# Patient Record
Sex: FEMALE | Race: BLACK OR AFRICAN AMERICAN
[De-identification: names, ages, dates, MRNs, and addresses within clinical notes are randomized per-mention and may not be internally consistent; named-entity substitution may affect disease eponyms.]

---

## 2020-09-14 ENCOUNTER — HOSPITAL ENCOUNTER (EMERGENCY)
Dept: HOSPITAL 88 - ER | Age: 65
Discharge: HOME | End: 2020-09-14
Payer: COMMERCIAL

## 2020-09-14 VITALS — WEIGHT: 141 LBS | HEIGHT: 59 IN | BODY MASS INDEX: 28.43 KG/M2

## 2020-09-14 DIAGNOSIS — R33.9: Primary | ICD-10-CM

## 2020-09-14 DIAGNOSIS — R30.0: ICD-10-CM

## 2020-09-14 LAB
BACTERIA URNS QL MICRO: (no result) /HPF
BILIRUB UR QL: NEGATIVE
CLARITY UR: (no result)
COLOR UR: (no result)
DEPRECATED RBC URNS MANUAL-ACNC: >50 /HPF (ref 0–5)
EPI CELLS URNS QL MICRO: (no result) /LPF
KETONES UR QL STRIP.AUTO: (no result)
LEUKOCYTE ESTERASE UR QL STRIP.AUTO: NEGATIVE
NITRITE UR QL STRIP.AUTO: NEGATIVE
PROT UR QL STRIP.AUTO: (no result)
SP GR UR STRIP: 1.02 (ref 1.01–1.02)
UROBILINOGEN UR STRIP-MCNC: 0.2 MG/DL (ref 0.2–1)
WBC #/AREA URNS HPF: (no result) /HPF (ref 0–5)

## 2020-09-14 PROCEDURE — 99284 EMERGENCY DEPT VISIT MOD MDM: CPT

## 2020-09-14 PROCEDURE — 81001 URINALYSIS AUTO W/SCOPE: CPT

## 2020-09-14 PROCEDURE — 51700 IRRIGATION OF BLADDER: CPT

## 2020-09-14 PROCEDURE — 74176 CT ABD & PELVIS W/O CONTRAST: CPT

## 2020-09-14 NOTE — DIAGNOSTIC IMAGING REPORT
EXAMINATION: CT of the abdomen and pelvis without contrast.

 

TECHNIQUE: Spiral CT images of the abdomen and pelvis were performed from the

lung bases to the lesser trochanters.  No intravenous contrast was given per

physician's request.  Coronal and sagittal reformatted images were obtained.



COMPARISON:  None.



CLINICAL HISTORY:Suprapubic pain, UTI, patient does self-catheterization

     

DISCUSSION: ABSENCE OF INTRAVENOUS CONTRAST DECREASES SENSITIVITY FOR DETECTION

OF FOCAL LESIONS AND VASCULAR PATHOLOGY.



ABDOMEN/PELVIS:



LOWER THORAX: Mild bilateral lower lobe dependent atelectasis. Lung bases are

otherwise clear. Mild atherosclerotic calcification of the coronary arteries. 



HEPATOBILIARY: No focal hepatic lesions.  No intra or extrahepatic biliary

ductal dilation.



GALLBLADDER: No radio-opaque stones or sludge.  No wall thickening.



SPLEEN: No splenomegaly.



PANCREAS: No focal masses or ductal dilatation.



ADRENALS: No adrenal nodules.



KIDNEYS/URETERS: No renal or ureteral calculi, hydronephrosis or obstruction.

No contour abnormalities or perinephric stranding.



PELVIC ORGANS/BLADDER: Bladder is markedly distended, with bladder dome above

the level of the umbilicus. No wall thickening. No focal lesions. Uterus is

unremarkable. No adnexal masses.



PERITONEUM/RETROPERITONEUM: No free air or fluid.



LYMPH NODES: No intra-abdominal,retroperitoneal, pelvic or inguinal

lymphadenopathy.



VESSELS: Atherosclerotic calcification of the distal abdominal aorta and

proximal iliac vessels.



GI TRACT: No bowel dilation or evidence of obstruction. No pericolonic

inflammatory changes. Appendix is identified and normal in caliber. 4-5 mm

intraluminal radiopaque density in the descending colon likely represents

undigested pill material (series 3, image 110). Small amount of retained stool

in the rectum.



BONES AND SOFT TISSUES: No aggressive lytic or suspicious focal sclerotic

lesions. Generalized osteopenia. Degenerated discs in the lower thoracic and

lumbosacral spine, worse at L5-S1. Mild canal stenosis at L4-L5. Facet

hypertrophy L4-L5 and L5-S1.

Small fat-containing of ventricle hernia.



IMPRESSION: 





1. Markedly distended bladder, with bladder dome above the level of the

umbilicus. No focal lesions or wall thickening in this noncontrast exam.

Findings may represent bladder outlet obstruction.



2. No renal, ureteral or bladder calculi.



Signed by: Dr. Seth Garcia M.D. on 9/14/2020 6:41 PM

## 2020-09-14 NOTE — EMERGENCY DEPARTMENT NOTE
History of Present Illnes


History of Present Illness


Chief Complaint:  Genitourinary


History of Present Illness


This is a 64 year old  female with urinary retention which requires self cath

eterization presents to the ED for one day h/o dysuria  .


Historian:  Patient, Family Member


Arrival Mode:  Car


Onset (how long ago):  day(s) (2)


Location:  suprapubic


Radiation:  Reports abdomen (suprapubic )


Severity:  mild


Onset quality:  gradual


Duration (how long):  day(s) (1)


Timing of current episode:  constant


Progression:  unchanged


Chronicity:  new


Context:  Denies recent illness, Denies recent surgery, Denies recent 

immobilization, Denies recent travel, Denies trauma/injury, Denies new 

medications, Denies hx of DVT/PE, Denies non-compliance w/ medications, Denies 

other


Relieving factors:  none


Exacerbating factors:  none


Associated symptoms:  Denies denies other symptoms, Denies confusion, Denies 

chest pain, Denies cough, Denies diaphoresis, Denies fever/chills, Denies 

headaches, Denies loss of appetite, Denies malaise, Denies nausea/vomiting, 

Denies rash, Denies seizure, Denies shortness of breath, Denies syncope, Denies 

weakness, Denies other (JAY SCHMIDT DO)





Past Medical/Family History


Physician Review


I have reviewed the patient's past medical and family history.  Any updates have

been documented here.


 (JAY SCHMIDT DO)





Past Medical History


Recent Fever:  No


Clinical Suspicion of Infectio:  Yes


New/Unexplained Change in Ment:  No


Other Medical History:  


urinary retention


Past Surgical History:  None


 (JAY SCHMIDT DO)





Social History


Smoking Cessation:  Never Smoker


Alcohol Use:  None


Any Illegal Drug Use:  No


 (JAY SCHMIDT DO)





Review of Systems


Review of Systems


Constitutional:  Reports no symptoms


EENTM:  Reports no symptoms


Cardiovascular:  Reports no symptoms


Respiratory:  Reports no symptoms


Gastrointestinal:  Reports no symptoms


Genitourinary:  Reports dysuria


Musculoskeletal:  Reports no symptoms


Integumentary:  Reports no symptoms


Neurological:  Reports no symptoms


Psychological:  Reports no symptoms


Endocrine:  Reports no symptoms


Hematological/Lymphatic:  Reports no symptoms (JAY SCHMIDT DO)





Physical Exam


Related Data


Allergies:  


Coded Allergies:  


     No Known Allergies (Unverified , 20)


Triage Vital Signs





Vital Signs








  Date Time  Temp Pulse Resp B/P (MAP) Pulse Ox O2 Delivery O2 Flow Rate FiO2


 


20 17:31 97.6 75 16 138/87 97 Room Air  








 (JAY SCHMIDT DO)





Physical Exam


CONSTITUTIONAL





HENT


EYES





NECK


PULMONARY


CARDIOVASCULAR





GASTROINTESTINAL





GENITOURINARY





SKIN


MUSCULOSKELETAL





NEUROLOGICAL





PSYCHOLOGICAL





 (SCHMIDTJAY )





Results


Laboratory


Lab results reviewed:  Yes


 (ALICE RIVERA MD)


Imaging


Imaging results reviewed:  Yes


Impressions





[place] [id number]








Patient Name: AUGUST ALICIA         MR #: [id number]      


: 1955   Age/Sex: 64/F


Acct #: [id number]   Req #: [id number]


Adm Physician:       


Ordered by: JAY SCHMIDT DO      Report #: [id number]   


Location: ER      Room/Bed:    


_______________________________________________________________

____________________________________





Procedure: [id number] CT/CT ABDOMEN/PELVIS WO


Exam Date: 20                            Exam Time: 1800








REPORT STATUS: Signed





EXAMINATION: CT of the abdomen and pelvis without contrast.


 


TECHNIQUE: Spiral CT images of the abdomen and pelvis were performed from the


lung bases to the lesser trochanters.  No intravenous contrast was given per


physician's request.  Coronal and sagittal reformatted images were obtained.





COMPARISON:  None.





CLINICAL HISTORY:Suprapubic pain, UTI, patient does self-catheterization


     


DISCUSSION: ABSENCE OF INTRAVENOUS CONTRAST DECREASES SENSITIVITY FOR DETECTION


OF FOCAL LESIONS AND VASCULAR PATHOLOGY.





ABDOMEN/PELVIS:





LOWER THORAX: Mild bilateral lower lobe dependent atelectasis. Lung bases are


otherwise clear. Mild atherosclerotic calcification of the coronary arteries. 





HEPATOBILIARY: No focal hepatic lesions.  No intra or extrahepatic biliary


ductal dilation.





GALLBLADDER: No radio-opaque stones or sludge.  No wall thickening.





SPLEEN: No splenomegaly.





PANCREAS: No focal masses or ductal dilatation.





ADRENALS: No adrenal nodules.





KIDNEYS/URETERS: No renal or ureteral calculi, hydronephrosis or obstruction.


No contour abnormalities or perinephric stranding.





PELVIC ORGANS/BLADDER: Bladder is markedly distended, with bladder dome above


the level of the umbilicus. No wall thickening. No focal lesions. Uterus is


unremarkable. No adnexal masses.





PERITONEUM/RETROPERITONEUM: No free air or fluid.





LYMPH NODES: No intra-abdominal,retroperitoneal, pelvic or inguinal


lymphadenopathy.





VESSELS: Atherosclerotic calcification of the distal abdominal aorta and


proximal iliac vessels.





GI TRACT: No bowel dilation or evidence of obstruction. No pericolonic


inflammatory changes. Appendix is identified and normal in caliber. 4-5 mm


intraluminal radiopaque density in the descending colon likely represents


undigested pill material (series 3, image 110). Small amount of retained stool


in the rectum.





BONES AND SOFT TISSUES: No aggressive lytic or suspicious focal sclerotic


lesions. Generalized osteopenia. Degenerated discs in the lower thoracic and


lumbosacral spine, worse at L5-S1. Mild canal stenosis at L4-L5. Facet


hypertrophy L4-L5 and L5-S1.


Small fat-containing of ventricle hernia.





IMPRESSION: 








1. Markedly distended bladder, with bladder dome above the level of the


umbilicus. No focal lesions or wall thickening in this noncontrast exam.


Findings may represent bladder outlet obstruction.





2. No renal, ureteral or bladder calculi.





Signed by: Dr. Ming Garcia M.D. on 2020 6:41 PM








Dictated By: MING GARCIA MD


Electronically Signed By: MING GARCIA MD on 20


Transcribed By: VÍCTOR on 20 








COPY TO:   JAY SCHMIDT DO~











 (JAY SCHMIDT DO)


Imaging results reviewed:  Yes


 (ALICE RIVERA MD)





Assessment & Plan


Medical Decision Making


MDM


Diff Dx : UTI, urinary retention, urogenital CA


 (JAY SCHMIDT DO)


MDM


PT WITH URINARY RETENTION





I SPOKE WITH DR PRISCILLA MITCHELL INDWELLING [facility], HAVE PT FOLLOW UP IN OFFICE NEXT

WEEK


 (ALICE RIVERA MD)





Assessment & Plan


Final Impression:  


(1) Urinary retention (ALICE RIVERA MD)


Depart Disposition:  HOME, SELF-CARE


Last Vital Signs











  Date Time  Temp Pulse Resp B/P (MAP) Pulse Ox O2 Delivery O2 Flow Rate FiO2


 


20 17:31 97.6 75 16 138/87 97 Room Air  








 (SCHMIDT,JAY JENSEN DO                Sep 14, 2020 17:48


ALICE RIVERA MD        Sep 14, 2020 20:02

## 2020-09-14 NOTE — XMS REPORT
Continuity of Care Document

                             Created on: 2020



AUGUST ALICIA

External Reference #: [id number]

: [id number]

Sex: Female



Demographics





                          Address                   [address] [place]  [address]

 

                          Home Phone                [phone]

 

                          Preferred Language        English

 

                          Marital Status            Unknown

 

                          Synagogue Affiliation     Unknown

 

                          Race                      Unknown

 

                          Additional Race(s)        



 

                          Ethnic Group              Unknown





Author





                          Author                    [facility]

 

                          Organization              [facility]

 

                          Address                   [address]

[place]  [address]



 

                          Phone                     Unavailable







Care Team Providers





                    Care Team Member Name Role                Phone

 

                          Unavailable               Unavailable







Problems





           Condition Name Condition Details Condition Category Status     Onset 

Date Resolution

Date            Last Treatment Date Treating Clinician Comments        Source

 

       Xerostomia Xerostomia Problem Active 2019 00:00:00                 

            [place]

 

        Diabetic neuropathy Diabetic Neuropathy Problem Active  2018 00:00

:00                  

                                                    [place]

 

       Seasonal allergy Seasonal Allergy Problem Active 2018 00:00:00     

                        

[place]

 

       Cataract Cataract Problem Active 2018 00:00:00                     

        [place]

 

             Body mass index 30+ - obesity Body Mass Index 30+ - Obesity Problem

      Active       

2018 00:00:00                                                     [place]

 

       Diabetes mellitus Diabetes Mellitus Problem Active 2018 00:00:00   

                          

[place]

 

       Hyperlipidemia Hyperlipidemia Problem Active 2018 00:00:00         

                    [place]

 

             Carpal tunnel syndrome Carpal Tunnel Syndrome Problem      Active  

     2018 00:00:00

                                                                 [place]

 

           Hypertensive disorder Hypertensive Disorder Problem    Active     201

30 00:00:00  

                                                                [facility]

joaquim

 

             Coronary arteriosclerosis Coronary Arteriosclerosis Problem      Ac

tive       2018 

00:00:00                                                         [place]

 

                          Gastroesophageal reflux disease without esophagitis Ga

stroesophageal Reflux 

Disease without Esophagitis Problem Active  2018 00:00:00                 

                [place]

 

                    Bladder muscle dysfunction - overactive Bladder Muscle Dysfu

nction - Overactive 

Problem   Active    2018 00:00:00                                         

[place]

 

       Osteoarthritis Osteoarthritis Problem Active 2018 00:00:00         

                    [place]

 

             Lumbosacral radiculopathy Lumbosacral Radiculopathy Problem      Ac

tive       2018 

00:00:00                                                         [place]







Allergies, Adverse Reactions, Alerts

This patient has no known allergies or adverse reactions.



Social History





                Smoking Status  Start Date      Stop Date       Source

 

                Never Smoker                                    [facility]

ractice







Medications





             Ordered Medication Name Filled Medication Name Start Date   Stop Da

te    Current 

Medication? Ordering Clinician Indication Dosage     Frequency  Signature (SIG) 

Comments                  Components                Source

 

                                        amoxicillin 875 mg-potassium clavulanate

 125 mg tablet Take 1 tablet every 12 

hours by oral route for 10 days.        amoxicillin 875 mg-potassium clavulanate

 125 mg

tablet Take 1 tablet every 12 hours by oral route for 10 days.                  

   No                            1         Q12H

                                        amoxicillin 875 mg-potassium clavulanate

 125 mg tablet Take 1 tablet every 12 

hours by oral route for 10 days.                                         [place]

 

                          atorvastatin 40 mg tablet Take 1 tablet every day by o

ral route. atorvastatin 40

mg tablet Take 1 tablet every day by oral route.                 No             

         1       Q1D     atorvastatin 

40 mg tablet Take 1 tablet every day by oral route.                             

            [place]

 

                                        azelastine 137 mcg (0.1 %) nasal spray a

erosol Spray 1 spray twice a day by 

intranasal route as needed.             azelastine 137 mcg (0.1 %) nasal spray a

erosol Spray

1 spray twice a day by intranasal route as needed.                 No           

           1spray(s) BID     

azelastine 137 mcg (0.1 %) nasal spray aerosol Spray 1 spray twice a day by 
intranasal route as needed.                                         [facility]

 

                                        Bromfed DM 2 mg-30 mg-10 mg/5 mL oral sy

rup Take 10 mL every 6 hours by oral 

route as needed. prn cough and congestion Bromfed DM 2 mg-30 mg-10 mg/5 mL oral 

syrup Take 10 mL every 6 hours by oral route as needed. prn cough and congestion
                        No                      10mL    Q6H     Bromfed DM 2 mg-

30 mg-10 mg/5 mL oral syrup Take 10 mL every

6 hours by oral route as needed. prn cough and congestion                       

                  [place]

 

                          clopidogrel 75 mg tablet Take 1 tablet every day by or

al route. clopidogrel 75 

mg tablet Take 1 tablet every day by oral route.                 No             

         1       Q1D     clopidogrel 75

mg tablet Take 1 tablet every day by oral route.                                

         [place]

 

                          doxazosin 1 mg tablet TAKE 1 TABLET(S) EVERY DAY BY OR

AL ROUTE doxazosin 1 mg 

tablet TAKE 1 TABLET(S) EVERY DAY BY ORAL ROUTE                 No              

                        doxazosin 1 mg 

tablet TAKE 1 TABLET(S) EVERY DAY BY ORAL ROUTE                                 

        [place]

 

                          famotidine 40 mg tablet Take 1 tablet every day by ora

l route as needed. 

famotidine 40 mg tablet Take 1 tablet every day by oral route as needed.        

                         No              

                                1               Q1D             famotidine 40 mg

 tablet Take 1 tablet every day by oral route as 

needed.                                                     [facility]

ice

 

                                        fluticasone propionate 50 mcg/actuation 

nasal spray,suspension Spray 2 sprays 

every day by intranasal route as needed. fluticasone propionate 50 mcg/actuation

nasal spray,suspension Spray 2 sprays every day by intranasal route as needed.  

                    No                            2spray(s) Q1D       fluticason

e propionate 50 mcg/actuation nasal 

spray,suspension Spray 2 sprays every day by intranasal route as needed.        

                                 

[place]

 

                          isosorbide dinitrate 10 mg tablet Take 1 tablet every 

day by oral route. 

isosorbide dinitrate 10 mg tablet Take 1 tablet every day by oral route.        

                         No              

                                1               Q1D             isosorbide dinit

rate 10 mg tablet Take 1 tablet every day by oral 

route.                                                      [facility]

[facility]

 

                          losartan 25 mg tablet Take 1 tablet every day by oral 

route. losartan 25 mg 

tablet Take 1 tablet every day by oral route.                 No                

      1       Q1D     losartan 25 mg 

tablet Take 1 tablet every day by oral route.                                   

      [place]

 

                          metformin 500 mg tablet TAKE 1 TABLET BY MOUTH TWICE A

 DAY metformin 500 mg 

tablet TAKE 1 TABLET BY MOUTH TWICE A DAY                 No                    

                  metformin 500 mg tablet

TAKE 1 TABLET BY MOUTH TWICE A DAY                                         [facility]

 

                                        Novolog Mix 70-30 FlexPen U-100 Insulin 

100 unit/mL subcutaneous pen INJECT 30 

UNIT(S) TWICE A DAY BY SUBCUTANEOUS ROUTE. Novolog Mix 70-30 FlexPen U-100 

Insulin 100 unit/mL subcutaneous pen INJECT 30 UNIT(S) TWICE A DAY BY 
SUBCUTANEOUS ROUTE.                 No                                      [facility]

log Mix 70-30 FlexPen U-100 Insulin 100 

unit/mL subcutaneous pen INJECT 30 UNIT(S) TWICE A DAY BY SUBCUTANEOUS ROUTE.   

                        

                                        [place]

 

                          pilocarpine 5 mg tablet Take 1 tablet 3 times a day by

 oral route. pilocarpine 5

mg tablet Take 1 tablet 3 times a day by oral route.                 No         

             1       TID     

pilocarpine 5 mg tablet Take 1 tablet 3 times a day by oral route.              

                           [place]

 

                          Steglatro 15 mg tablet Take 1 tablet every day by oral

 route. Steglatro 15 mg 

tablet Take 1 tablet every day by oral route.                 No                

      1       Q1D     Steglatro 15 mg 

tablet Take 1 tablet every day by oral route.                                   

      [place]

 

                                        Victoza 3-Raffi 0.6 mg/0.1 mL (18 mg/3 mL)

 subcutaneous pen injector INJECT 1.8 MG

EVERY DAY BY SUBCUTANEOUSLY             Victoza 3-Raffi 0.6 mg/0.1 mL (18 mg/3 mL)

 

subcutaneous pen injector INJECT 1.8 MG EVERY DAY BY SUBCUTANEOUSLY             

          No                                

                                                    Victoza 3-Raffi 0.6 mg/0.1 mL 

(18 mg/3 mL) subcutaneous pen injector INJECT 1.8 

MG EVERY DAY BY SUBCUTANEOUSLY                                         [facility]







Immunizations





           Ordered Immunization Name Filled Immunization Name Date       Status 

    Comments   Source

 

                    influenza, injectable, quadrivalent influenza, injectable, q

uadrivalent 

2019-01-15 10:47:00 Completed                               [facility]

ice

 

                    pneumococcal polysaccharide PPV23 pneumococcal polysaccharid

e PPV23 2019-01-15 

10:47:00            Completed                               [facility]

ice

 

           Tdap       Tdap       2018 11:06:00 Completed             [facility]

 

                          influenza, injectable, quadrivalent, preservative free

 influenza, injectable, 

quadrivalent, preservative free 2018 11:05:50 Completed                   

    [place]







Vital Signs





             Vital Name   Observation Time Observation Value Comments     Source

 

             BP Diastolic 2019 00:00:00 72 mm[Hg]                 [facility]

 

             Height       2019 00:00:00 58.5 [in_i]               [facility]

 

             BMI (Body Mass Index) 2019 00:00:00 28.4 kg/m2               

 [facility]

 

             BP Systolic  2019 00:00:00 118 mm[Hg]                [place]

 

             Body Weight  2019 00:00:00 138 [lb_av]               [facility]

 

             BP Diastolic 2019 00:00:00 76 mm[Hg]                 [place]

 

             Height       2019 00:00:00 58.5 [in_i]               [facility]

 

             BMI (Body Mass Index) 2019 00:00:00 29.6 kg/m2               

 [facility]

 

             BP Systolic  2019 00:00:00 122 mm[Hg]                [place]

 

             Body Weight  2019 00:00:00 144 [lb_av]               [facility]

 

             BP Diastolic 2019 00:00:00 68 mm[Hg]                 [place]

 

             Height       2019 00:00:00 58.5 [in_i]               [facility]

 

             BMI (Body Mass Index) 2019 00:00:00 29.4 kg/m2               

 [place]

 

             BP Systolic  2019 00:00:00 120 mm[Hg]                [place]

 

             Body Weight  2019 00:00:00 143.3 [lb_av]              [place]







Procedures





                Procedure       Date / Time Performed Performing Clinician Sourc

e

 

                MAMMO, screening, digital, bilateral 2019 00:00:00        

         [place]

 

                XR, lumbar spine 2019 00:00:00                 [facility]

 

                X-RAY OF FOOT 3+ VIEW 2019 00:00:00                 H[facility]

[facility]

 

                Cardiac Catheterization                                 [place]

 

                Carpal Tunnel Surgery                                 [facility]

 

                Knee Surgery                                    [facility]

ractice







Plan of Care





             Planned Activity Planned Date Details      Comments     Source

 

                    Diagnostic Test Pending 2019 00:00:00 CBC w/ auto diff

 [code = CBC w/ auto

 diff]                                              [place]

 

                    Diagnostic Test Pending 2019 00:00:00 CMP, serum or pl

asma [code = CMP, 

serum or plasma]                                    [place]

 

                    Diagnostic Test Pending 2019 00:00:00 TSH, serum or pl

asma [code = TSH, 

serum or plasma]                                    [place]

 

                    Diagnostic Test Pending 2019 00:00:00 lipid panel, ser

um [code = lipid 

panel, serum]                                       [place]

 

                    Diagnostic Test Pending 2019 00:00:00 colon cancer scr

eening, stool [code 

= colon cancer screening, stool]                           [facility]

ce

 

                    Diagnostic Test Pending 2019 00:00:00 HbA1c (hemoglobi

n A1c), blood [code 

= HbA1c (hemoglobin A1c), blood]                           [facility]

ce

 

                    Diagnostic Test Pending 2019 00:00:00 microalbumin/cre

atinine, ratio, 

urine [code = microalbumin/creatinine, ratio, urine]                           [facility]

[facility]

 

                    Future Appointment  2020 00:00:00 Puma Leon, [address]; [address], 

[place] [address]                             [place]

 

             Instructions                                        [place]







Encounters





             Start Date/Time End Date/Time Encounter Type Admission Type Attendi

[facility]   Care Department Encounter ID    Source

 

        2020 02:31:00 2020 02:31:00 Emergency E               BL  

  BL    [id number]    [facility]

 

                    2019 00:00:00 2019 00:00:00 Puma Leon MD: 

[address], [address], [place] [address], Ph. [phone]                                

 [facility]   [id number]                  [place]

 

                    2019 00:00:00 2019 00:00:00 Puma Leon MD: 

[address] [address], Ph. [phone]                                

 [facility]   [id number]                  [place]

 

                    2019 00:00:00 2019 00:00:00 Puma Leon MD: 

[address] [address], Ph. [phone]                                

 [facility]   [id number]                  [place]

 

                    2019-01-15 00:00:00 2019-01-15 00:00:00 Puma Leon MD: 

[address], [place] [address], Ph. [phone]                                

 [facility]   [id number]                  [place]







Results

This patient has no known results.